# Patient Record
Sex: FEMALE | Race: WHITE | NOT HISPANIC OR LATINO
[De-identification: names, ages, dates, MRNs, and addresses within clinical notes are randomized per-mention and may not be internally consistent; named-entity substitution may affect disease eponyms.]

---

## 2017-06-11 ENCOUNTER — RESULT REVIEW (OUTPATIENT)
Age: 57
End: 2017-06-11

## 2017-06-12 ENCOUNTER — APPOINTMENT (OUTPATIENT)
Dept: OBGYN | Facility: CLINIC | Age: 57
End: 2017-06-12

## 2017-06-30 ENCOUNTER — APPOINTMENT (OUTPATIENT)
Dept: PULMONOLOGY | Facility: CLINIC | Age: 57
End: 2017-06-30

## 2017-10-30 ENCOUNTER — APPOINTMENT (OUTPATIENT)
Dept: PULMONOLOGY | Facility: CLINIC | Age: 57
End: 2017-10-30
Payer: COMMERCIAL

## 2017-10-30 VITALS
OXYGEN SATURATION: 96 % | DIASTOLIC BLOOD PRESSURE: 80 MMHG | HEART RATE: 81 BPM | HEIGHT: 62 IN | WEIGHT: 146 LBS | SYSTOLIC BLOOD PRESSURE: 110 MMHG | BODY MASS INDEX: 26.87 KG/M2

## 2017-10-30 DIAGNOSIS — Z86.39 PERSONAL HISTORY OF OTHER ENDOCRINE, NUTRITIONAL AND METABOLIC DISEASE: ICD-10-CM

## 2017-10-30 PROCEDURE — 99214 OFFICE O/P EST MOD 30 MIN: CPT | Mod: 25

## 2017-10-30 PROCEDURE — 94010 BREATHING CAPACITY TEST: CPT

## 2017-10-30 RX ORDER — PSEUDOEPHEDRINE HYDROCHLORIDE 120 MG/1
120 TABLET, FILM COATED, EXTENDED RELEASE ORAL
Refills: 0 | Status: ACTIVE | COMMUNITY

## 2018-04-30 ENCOUNTER — APPOINTMENT (OUTPATIENT)
Dept: PULMONOLOGY | Facility: CLINIC | Age: 58
End: 2018-04-30
Payer: COMMERCIAL

## 2018-04-30 VITALS
DIASTOLIC BLOOD PRESSURE: 80 MMHG | SYSTOLIC BLOOD PRESSURE: 122 MMHG | HEIGHT: 62 IN | BODY MASS INDEX: 26.68 KG/M2 | OXYGEN SATURATION: 98 % | HEART RATE: 92 BPM | WEIGHT: 145 LBS

## 2018-04-30 PROCEDURE — 94010 BREATHING CAPACITY TEST: CPT

## 2018-04-30 PROCEDURE — 99214 OFFICE O/P EST MOD 30 MIN: CPT | Mod: 25

## 2018-04-30 RX ORDER — SIMVASTATIN 10 MG/1
10 TABLET, FILM COATED ORAL
Qty: 90 | Refills: 0 | Status: ACTIVE | COMMUNITY
Start: 2018-04-19

## 2018-05-22 ENCOUNTER — APPOINTMENT (OUTPATIENT)
Dept: GASTROENTEROLOGY | Facility: CLINIC | Age: 58
End: 2018-05-22
Payer: COMMERCIAL

## 2018-05-22 VITALS
OXYGEN SATURATION: 98 % | HEART RATE: 80 BPM | BODY MASS INDEX: 27.97 KG/M2 | HEIGHT: 62 IN | SYSTOLIC BLOOD PRESSURE: 120 MMHG | TEMPERATURE: 98.5 F | WEIGHT: 152 LBS | DIASTOLIC BLOOD PRESSURE: 80 MMHG

## 2018-05-22 DIAGNOSIS — Z87.19 PERSONAL HISTORY OF OTHER DISEASES OF THE DIGESTIVE SYSTEM: ICD-10-CM

## 2018-05-22 DIAGNOSIS — Z83.79 FAMILY HISTORY OF OTHER DISEASES OF THE DIGESTIVE SYSTEM: ICD-10-CM

## 2018-05-22 PROCEDURE — 36415 COLL VENOUS BLD VENIPUNCTURE: CPT

## 2018-05-22 PROCEDURE — 99214 OFFICE O/P EST MOD 30 MIN: CPT | Mod: 25

## 2018-05-22 RX ORDER — GINSENG 100 MG
CAPSULE ORAL
Refills: 0 | Status: ACTIVE | COMMUNITY

## 2018-05-22 RX ORDER — ACETAMINOPHEN 325 MG/1
TABLET, FILM COATED ORAL
Refills: 0 | Status: ACTIVE | COMMUNITY

## 2018-05-22 RX ORDER — OXYCODONE AND ACETAMINOPHEN 10; 325 MG/1; MG/1
10-325 TABLET ORAL
Qty: 40 | Refills: 0 | Status: DISCONTINUED | COMMUNITY
Start: 2018-03-12 | End: 2018-05-22

## 2018-05-22 RX ORDER — PSEUDOEPHEDRINE HCL 120 MG
120 TABLET, EXTENDED RELEASE ORAL
Qty: 30 | Refills: 0 | Status: DISCONTINUED | COMMUNITY
Start: 2017-03-10 | End: 2018-05-22

## 2018-05-22 RX ORDER — MULTIVITAMIN
TABLET ORAL
Refills: 0 | Status: ACTIVE | COMMUNITY

## 2018-05-22 RX ORDER — CHOLECALCIFEROL (VITAMIN D3) 25 MCG
TABLET ORAL
Refills: 0 | Status: ACTIVE | COMMUNITY

## 2018-05-22 RX ORDER — TRIAMCINOLONE ACETONIDE 1 MG/G
0.1 CREAM TOPICAL
Qty: 30 | Refills: 0 | Status: DISCONTINUED | COMMUNITY
Start: 2018-03-26 | End: 2018-05-22

## 2018-05-24 LAB
ALBUMIN SERPL ELPH-MCNC: 4.1 G/DL
ALP BLD-CCNC: 91 U/L
ALT SERPL-CCNC: 17 U/L
ANION GAP SERPL CALC-SCNC: 11 MMOL/L
AST SERPL-CCNC: 19 U/L
BASOPHILS # BLD AUTO: 0.02 K/UL
BASOPHILS NFR BLD AUTO: 0.3 %
BILIRUB SERPL-MCNC: 1 MG/DL
BUN SERPL-MCNC: 28 MG/DL
CALCIUM SERPL-MCNC: 9.6 MG/DL
CHLORIDE SERPL-SCNC: 103 MMOL/L
CO2 SERPL-SCNC: 27 MMOL/L
CREAT SERPL-MCNC: 0.7 MG/DL
ENDOMYSIUM IGA SER QL: NEGATIVE
ENDOMYSIUM IGA TITR SER: NORMAL
EOSINOPHIL # BLD AUTO: 0.26 K/UL
EOSINOPHIL NFR BLD AUTO: 4 %
GLIADIN IGA SER QL: <5 UNITS
GLIADIN IGG SER QL: <5 UNITS
GLIADIN PEPTIDE IGA SER-ACNC: NEGATIVE
GLIADIN PEPTIDE IGG SER-ACNC: NEGATIVE
GLUCOSE SERPL-MCNC: 80 MG/DL
HCT VFR BLD CALC: 39.5 %
HGB BLD-MCNC: 12.5 G/DL
IGA SER QL IEP: 104 MG/DL
IMM GRANULOCYTES NFR BLD AUTO: 0.3 %
LYMPHOCYTES # BLD AUTO: 1.99 K/UL
LYMPHOCYTES NFR BLD AUTO: 30.9 %
MAN DIFF?: NORMAL
MCHC RBC-ENTMCNC: 29.1 PG
MCHC RBC-ENTMCNC: 31.6 GM/DL
MCV RBC AUTO: 91.9 FL
MONOCYTES # BLD AUTO: 0.49 K/UL
MONOCYTES NFR BLD AUTO: 7.6 %
NEUTROPHILS # BLD AUTO: 3.65 K/UL
NEUTROPHILS NFR BLD AUTO: 56.9 %
PLATELET # BLD AUTO: 255 K/UL
POTASSIUM SERPL-SCNC: 4.4 MMOL/L
PROT SERPL-MCNC: 7.7 G/DL
RBC # BLD: 4.3 M/UL
RBC # FLD: 13.7 %
SODIUM SERPL-SCNC: 141 MMOL/L
TSH SERPL-ACNC: 3.24 UIU/ML
TTG IGA SER IA-ACNC: <5 UNITS
TTG IGA SER-ACNC: NEGATIVE
TTG IGG SER IA-ACNC: <5 UNITS
TTG IGG SER IA-ACNC: NEGATIVE
WBC # FLD AUTO: 6.43 K/UL

## 2018-08-06 ENCOUNTER — RESULT REVIEW (OUTPATIENT)
Age: 58
End: 2018-08-06

## 2018-08-07 ENCOUNTER — APPOINTMENT (OUTPATIENT)
Dept: OBGYN | Facility: CLINIC | Age: 58
End: 2018-08-07
Payer: COMMERCIAL

## 2018-08-07 PROCEDURE — 99396 PREV VISIT EST AGE 40-64: CPT

## 2018-11-05 ENCOUNTER — APPOINTMENT (OUTPATIENT)
Dept: PULMONOLOGY | Facility: CLINIC | Age: 58
End: 2018-11-05

## 2018-11-14 ENCOUNTER — APPOINTMENT (OUTPATIENT)
Dept: PULMONOLOGY | Facility: CLINIC | Age: 58
End: 2018-11-14
Payer: COMMERCIAL

## 2018-11-14 ENCOUNTER — NON-APPOINTMENT (OUTPATIENT)
Age: 58
End: 2018-11-14

## 2018-11-14 VITALS
HEART RATE: 68 BPM | BODY MASS INDEX: 28.51 KG/M2 | DIASTOLIC BLOOD PRESSURE: 70 MMHG | SYSTOLIC BLOOD PRESSURE: 100 MMHG | HEIGHT: 61 IN | WEIGHT: 151 LBS | OXYGEN SATURATION: 98 % | RESPIRATION RATE: 17 BRPM

## 2018-11-14 PROCEDURE — 99214 OFFICE O/P EST MOD 30 MIN: CPT | Mod: 25

## 2018-11-14 PROCEDURE — 94010 BREATHING CAPACITY TEST: CPT

## 2018-11-14 PROCEDURE — 95012 NITRIC OXIDE EXP GAS DETER: CPT

## 2018-11-14 NOTE — HISTORY OF PRESENT ILLNESS
[FreeTextEntry1] : Ms. Altamirano is a 84 year old female presenting to the office for a follow up visit for allergic rhinitis, abnormal chest CT, asthma, and low vitamin D. Her chief complaint is her weight.\par -She notes she has been feeling well generally and she has been walking with her dog for exercise\par -She states she has been trying to sleep more and is feeling more well rested\par -She states she has been trying to work less as she is fatigued\par -She notes her GI issues have resolved\par -She states she takes Clarinex daily and Sudafed prn and it controls her allergies\par -she notes she sleeps well and does not believe she snores\par -she reports her bowels are regular\par -she states her sense of smell and taste are good\par -SHe states her breathing is well controlled\par -She reports she wants to lose weight\par -She denies heartburn, reflux, itchy eyes or ears, nasal congestion,  heartburn, reflux, coughing, wheezing,

## 2018-11-14 NOTE — ASSESSMENT
[FreeTextEntry1] : Ms. Altamirano is stable from a pulmonary perspective with a history of mild intermittent asthma, allergies, and abnormal chest CT. \par \par problem 1: abnormal chest CT  (found s/p uterine surgery)\par -f/u chest CT due in March 2019\par \par problem 2: allergic rhinitis\par -continue to use Clarinex 5 mg QAM\par -continue to use Xyzal 5 mg QHS\par -recommended Xlear \par -continue Zaditor eye drops prn \par \par -Environmental measures for allergies were encouraged including mattress and pillow cover, air purifier, and environmental controls.\par \par problem 3: mild intermittent asthma\par -quiet, no therapy at this point in time\par \par -Asthma is  believed to be caused by inherited (genetic) and environmental factor, but its exact cause is unknown. Asthma may be triggered by allergens, lung infections, or irritants in the air. Asthma triggers are different for each person\par \par problem 4: low vitamin D\par -continue to use supplemental vitamin D \par -Has been associated with asthma exacerbations and increased allergic symptoms. The goal based on recent information is maintaining levels between 50-70 and low normal is 30. Recommended 50,000 units every two weeks to once a month depending on the level.\par \par problem 5: GI complaints\par -recommended IB Guard\par \par problem 6: health maintenance \par -recommended CoQ 10 at 200 mg QD\par -recommended increased aerobic exercise \par -s/p 2018 flu shot \par -recommended strep pneumonia vaccines: Prevnar-13 vaccine, followed by Pneumo vaccine 23 one year following\par -recommended early intervention for URIs\par -recommended regular osteoporosis evaluations\par -recommended early dermatological evaluations\par -recommended after the age of 50 to the age of 70, colonoscopy every 5 years \par \par F/U in 6 months \par She is encouraged to call with any changes, concerns, or questions

## 2018-11-14 NOTE — ADDENDUM
[FreeTextEntry1] : Documented by Lori Iniguez acting as a scribe for Dr. Braulio Orourke on 11/14/2018.\par \par All medical record entries made by the Scribe were at my, Dr. Braulio Orourke's, direction and personally dictated by me on 11/14/2018. I have reviewed the chart and agree that the record accurately reflects my personal performance of the history, physical exam, assessment and plan. I have also personally directed, reviewed, and agree with the discharge instructions.

## 2018-11-14 NOTE — PROCEDURE
[FreeTextEntry1] : FENO was    13   ; a normal value being less than 25\par \par Fractional exhaled nitric oxide (FENO) is regarded as a simple, noninvasive method for assessing eosinophilic airway inflammation. Produced by a variety of cells within the lung, nitric oxide (NO) concentrations are generally low in healthy individuals. However, high concentrations of NO appear to be involved in nonspecific host defense mechanisms and chronic inflammatory diseases such as asthma. The American Thoracic Society (ATS) therefore has recommended using FENO to aid in the diagnosis and monitoring of eosinophilic airway inflammation and asthma, and for identifying steroid responsive individuals whose chronic respiratory symptoms may be caused by airway inflammation. \par \par PFT-spi reveals normal flows with FEV1 of  2.33 ,which is  98% of predicted, normal flow volume loop

## 2018-11-14 NOTE — REASON FOR VISIT
[Follow-Up] : a follow-up visit [FreeTextEntry1] : allergic rhinitis, abnormal chest CT, asthma, and low vitamin D

## 2019-05-14 ENCOUNTER — APPOINTMENT (OUTPATIENT)
Dept: PULMONOLOGY | Facility: CLINIC | Age: 59
End: 2019-05-14

## 2019-06-13 ENCOUNTER — APPOINTMENT (OUTPATIENT)
Dept: PULMONOLOGY | Facility: CLINIC | Age: 59
End: 2019-06-13
Payer: COMMERCIAL

## 2019-06-13 ENCOUNTER — NON-APPOINTMENT (OUTPATIENT)
Age: 59
End: 2019-06-13

## 2019-06-13 VITALS
SYSTOLIC BLOOD PRESSURE: 104 MMHG | WEIGHT: 147 LBS | RESPIRATION RATE: 17 BRPM | DIASTOLIC BLOOD PRESSURE: 78 MMHG | BODY MASS INDEX: 27.05 KG/M2 | OXYGEN SATURATION: 100 % | HEART RATE: 70 BPM | HEIGHT: 62 IN

## 2019-06-13 DIAGNOSIS — Z87.898 PERSONAL HISTORY OF OTHER SPECIFIED CONDITIONS: ICD-10-CM

## 2019-06-13 DIAGNOSIS — R15.2 FECAL URGENCY: ICD-10-CM

## 2019-06-13 PROCEDURE — 94010 BREATHING CAPACITY TEST: CPT

## 2019-06-13 PROCEDURE — 99214 OFFICE O/P EST MOD 30 MIN: CPT | Mod: 25

## 2019-06-13 RX ORDER — MINOCYCLINE HYDROCHLORIDE 100 MG/1
100 TABLET ORAL
Refills: 0 | Status: DISCONTINUED | COMMUNITY
End: 2019-06-13

## 2019-06-13 RX ORDER — LACTOBACILLUS RHAMNOSUS GG 10B CELL
CAPSULE ORAL
Refills: 0 | Status: DISCONTINUED | COMMUNITY
End: 2019-06-13

## 2019-06-13 NOTE — REVIEW OF SYSTEMS
[As Noted in HPI] : as noted in HPI [Itchy Eyes] : itching of ~T the eyes [Negative] : Sleep Disorder [Heartburn] : no heartburn [Diarrhea] : no diarrhea [Reflux] : no reflux [Constipation] : no constipation

## 2019-06-13 NOTE — PROCEDURE
[FreeTextEntry1] : PFT revealed normal flows, with a FEV1 of 2.18L, which is 88% of predicted, with a normal flow volume loop\par \par Chest CT (5.28.19) reveals small lower lobe pulmonary nodules remain unchanged over 5 years consistent with benign histology.  Previously noted right upper lobe and middle lobe bronchial wall thickening has resolved.  No acute intrathoracic abnormalities.

## 2019-06-13 NOTE — PHYSICAL EXAM
[General Appearance - Well Developed] : well developed [Normal Appearance] : normal appearance [No Deformities] : no deformities [Well Groomed] : well groomed [General Appearance - Well Nourished] : well nourished [General Appearance - In No Acute Distress] : no acute distress [Normal Conjunctiva] : the conjunctiva exhibited no abnormalities [Eyelids - No Xanthelasma] : the eyelids demonstrated no xanthelasmas [Normal Oropharynx] : normal oropharynx [II] : II [Neck Appearance] : the appearance of the neck was normal [Jugular Venous Distention Increased] : there was no jugular-venous distention [Neck Cervical Mass (___cm)] : no neck mass was observed [Thyroid Nodule] : there were no palpable thyroid nodules [Thyroid Diffuse Enlargement] : the thyroid was not enlarged [Heart Rate And Rhythm] : heart rate and rhythm were normal [Murmurs] : no murmurs present [Heart Sounds] : normal S1 and S2 [Respiration, Rhythm And Depth] : normal respiratory rhythm and effort [Exaggerated Use Of Accessory Muscles For Inspiration] : no accessory muscle use [Auscultation Breath Sounds / Voice Sounds] : lungs were clear to auscultation bilaterally [Abdomen Soft] : soft [Abdomen Tenderness] : non-tender [Abdomen Mass (___ Cm)] : no abdominal mass palpated [Abnormal Walk] : normal gait [Gait - Sufficient For Exercise Testing] : the gait was sufficient for exercise testing [Nail Clubbing] : no clubbing of the fingernails [Cyanosis, Localized] : no localized cyanosis [Petechial Hemorrhages (___cm)] : no petechial hemorrhages [Skin Color & Pigmentation] : normal skin color and pigmentation [] : no rash [No Venous Stasis] : no venous stasis [Skin Lesions] : no skin lesions [No Skin Ulcers] : no skin ulcer [No Xanthoma] : no  xanthoma was observed [Deep Tendon Reflexes (DTR)] : deep tendon reflexes were 2+ and symmetric [Sensation] : the sensory exam was normal to light touch and pinprick [No Focal Deficits] : no focal deficits [Oriented To Time, Place, And Person] : oriented to person, place, and time [Impaired Insight] : insight and judgment were intact [Affect] : the affect was normal [FreeTextEntry1] : I:E 1:3, clear

## 2019-06-13 NOTE — ASSESSMENT
[FreeTextEntry1] : Ms. Altamirano is stable from a pulmonary perspective with a history of mild intermittent asthma, allergies, and abnormal chest CT. \par \par problem 1: abnormal chest CT  (found s/p uterine surgery) (improved)\par -f/u chest CT due in 1/2020\par \par problem 2: allergic rhinitis\par -continue to use Clarinex 5 mg QAM\par -continue to use Xyzal 5 mg QHS\par -recommended Xlear \par -continue Zaditor eye drops prn \par \par -Environmental measures for allergies were encouraged including mattress and pillow cover, air purifier, and environmental controls.\par \par problem 3: mild intermittent asthma\par -quiet, no therapy at this point in time\par \par -Asthma is  believed to be caused by inherited (genetic) and environmental factor, but its exact cause is unknown. Asthma may be triggered by allergens, lung infections, or irritants in the air. Asthma triggers are different for each person\par \par problem 4: low vitamin D\par -continue to use supplemental vitamin D \par -Has been associated with asthma exacerbations and increased allergic symptoms. The goal based on recent information is maintaining levels between 50-70 and low normal is 30. Recommended 50,000 units every two weeks to once a month depending on the level.\par \par problem 5: GI complaints (quiet)\par -recommended IB Guard\par \par problem 6: health maintenance \par -Recommended to take Liva Guard to lower cholesterol\par -recommended CoQ 10 at 200 mg QD\par -recommended increased aerobic exercise \par -s/p 2018 flu shot \par -recommended strep pneumonia vaccines: Prevnar-13 vaccine, followed by Pneumo vaccine 23 one year following\par -recommended early intervention for URIs\par -recommended regular osteoporosis evaluations\par -recommended early dermatological evaluations\par -recommended after the age of 50 to the age of 70, colonoscopy every 5 years \par \par F/U in 6 months \par She is encouraged to call with any changes, concerns, or questions

## 2019-12-12 ENCOUNTER — NON-APPOINTMENT (OUTPATIENT)
Age: 59
End: 2019-12-12

## 2019-12-12 ENCOUNTER — APPOINTMENT (OUTPATIENT)
Dept: PULMONOLOGY | Facility: CLINIC | Age: 59
End: 2019-12-12
Payer: COMMERCIAL

## 2019-12-12 VITALS
HEIGHT: 60 IN | WEIGHT: 147 LBS | HEART RATE: 100 BPM | SYSTOLIC BLOOD PRESSURE: 112 MMHG | RESPIRATION RATE: 17 BRPM | OXYGEN SATURATION: 98 % | DIASTOLIC BLOOD PRESSURE: 70 MMHG | BODY MASS INDEX: 28.86 KG/M2

## 2019-12-12 PROCEDURE — 94010 BREATHING CAPACITY TEST: CPT

## 2019-12-12 PROCEDURE — 99214 OFFICE O/P EST MOD 30 MIN: CPT | Mod: 25

## 2019-12-12 PROCEDURE — 95012 NITRIC OXIDE EXP GAS DETER: CPT

## 2019-12-12 NOTE — HISTORY OF PRESENT ILLNESS
[FreeTextEntry1] : Ms. Altamirano is a 59 year old female presenting to the office for a follow up visit for allergic rhinitis, abnormal chest CT, asthma, and low vitamin D. Her chief complaint is working nights\par -she reports feeling generally well\par -she states she is sleeping well, and gets enough sleep, despite working night shifts at [employer]\par -she reports she is exercising more frequently by walking the dog between 2-6 miles per day\par -she reports her energy level is good\par -she is s/p the flu shot\par -she denies taking any new medications, vitamins, or supplements. \par -she denies any palpitations, chest pain, chest pressure, diarrhea, constipation, dysphagia, dizziness, sour taste in the mouth, leg swelling, leg pain, itchy eyes, itchy ears, heartburn, reflux, myalgias or arthralgias.

## 2019-12-12 NOTE — PROCEDURE
[FreeTextEntry1] : PFT revealed normal flows, with a FEV1 of 2.31L, which is 102% of predicted, with a normal flow volume loop \par \par FENO was 14; a normal value being less than 25\par Fractional exhaled nitric oxide (FENO) is regarded as a simple, noninvasive method for assessing eosinophilic airway inflammation. Produced by a variety of cells within the lung, nitric oxide (NO) concentrations are generally low in healthy individuals. However, high concentrations of NO appear to be involved in nonspecific host defense mechanisms and chronic inflammatory diseases such as asthma. The American Thoracic Society (ATS) therefore has recommended using FENO to aid in the diagnosis and monitoring of eosinophilic airway inflammation and asthma, and for identifying steroid responsive individuals whose chronic respiratory symptoms may be caused by airway inflammation.

## 2019-12-12 NOTE — ADDENDUM
[FreeTextEntry1] : Documented by Oscar Brown acting as a scribe for Dr. Braulio Orourke on 12/12/2019.\par \par All medical record entries made by the Scribe were at my, Dr. Braulio Orourke's, direction and personally dictated by me on 12/12/2019. I have reviewed the chart and agree that the record accurately reflects my personal performance of the history, physical exam, assessment and plan. I have also personally directed, reviewed, and agree with the discharge instructions.

## 2019-12-12 NOTE — ASSESSMENT
[FreeTextEntry1] : Ms. Altamirano is stable from a pulmonary perspective with a history of mild intermittent asthma, allergies, and abnormal chest CT - quiet.\par \par problem 1: abnormal chest CT  (found s/p uterine surgery) (improved)\par -f/u chest CT due in 5/2020\par \par problem 2: allergic rhinitis\par -continue to use Clarinex 5 mg QAM\par -continue to use Xyzal 5 mg QHS\par -recommended Xlear \par -continue Zaditor eye drops prn \par \par -Environmental measures for allergies were encouraged including mattress and pillow cover, air purifier, and environmental controls.\par \par problem 3: mild intermittent asthma\par -quiet, no therapy at this point in time\par \par -Asthma is  believed to be caused by inherited (genetic) and environmental factor, but its exact cause is unknown. Asthma may be triggered by allergens, lung infections, or irritants in the air. Asthma triggers are different for each person\par \par problem 4: low vitamin D\par -continue to use supplemental vitamin D \par -Has been associated with asthma exacerbations and increased allergic symptoms. The goal based on recent information is maintaining levels between 50-70 and low normal is 30. Recommended 50,000 units every two weeks to once a month depending on the level.\par \par problem 5: GI complaints (quiet)\par -recommended IB Guard\par \par problem 6: health maintenance \par -Recommended to take Liva Guard to lower cholesterol\par -recommended CoQ 10 at 200 mg QD\par -recommended increased aerobic exercise \par -s/p 2019 flu shot \par -recommended strep pneumonia vaccines: Prevnar-13 vaccine, followed by Pneumo vaccine 23 one year following\par -recommended early intervention for URIs\par -recommended regular osteoporosis evaluations\par -recommended early dermatological evaluations\par -recommended after the age of 50 to the age of 70, colonoscopy every 5 years \par \par F/U in 6 months \par She is encouraged to call with any changes, concerns, or questions

## 2019-12-12 NOTE — PHYSICAL EXAM
[General Appearance - Well Developed] : well developed [Normal Appearance] : normal appearance [Well Groomed] : well groomed [General Appearance - Well Nourished] : well nourished [No Deformities] : no deformities [General Appearance - In No Acute Distress] : no acute distress [Normal Conjunctiva] : the conjunctiva exhibited no abnormalities [Eyelids - No Xanthelasma] : the eyelids demonstrated no xanthelasmas [Normal Oropharynx] : normal oropharynx [II] : II [Neck Appearance] : the appearance of the neck was normal [Jugular Venous Distention Increased] : there was no jugular-venous distention [Thyroid Diffuse Enlargement] : the thyroid was not enlarged [Neck Cervical Mass (___cm)] : no neck mass was observed [Thyroid Nodule] : there were no palpable thyroid nodules [Murmurs] : no murmurs present [Heart Sounds] : normal S1 and S2 [Heart Rate And Rhythm] : heart rate and rhythm were normal [Exaggerated Use Of Accessory Muscles For Inspiration] : no accessory muscle use [Respiration, Rhythm And Depth] : normal respiratory rhythm and effort [Abdomen Soft] : soft [Auscultation Breath Sounds / Voice Sounds] : lungs were clear to auscultation bilaterally [Abdomen Tenderness] : non-tender [Abnormal Walk] : normal gait [Abdomen Mass (___ Cm)] : no abdominal mass palpated [Nail Clubbing] : no clubbing of the fingernails [Gait - Sufficient For Exercise Testing] : the gait was sufficient for exercise testing [Cyanosis, Localized] : no localized cyanosis [Petechial Hemorrhages (___cm)] : no petechial hemorrhages [Skin Color & Pigmentation] : normal skin color and pigmentation [] : no rash [No Venous Stasis] : no venous stasis [Skin Lesions] : no skin lesions [No Skin Ulcers] : no skin ulcer [No Xanthoma] : no  xanthoma was observed [Deep Tendon Reflexes (DTR)] : deep tendon reflexes were 2+ and symmetric [Sensation] : the sensory exam was normal to light touch and pinprick [No Focal Deficits] : no focal deficits [Impaired Insight] : insight and judgment were intact [Oriented To Time, Place, And Person] : oriented to person, place, and time [Affect] : the affect was normal [FreeTextEntry1] : I:E 1:3, clear

## 2020-09-17 ENCOUNTER — APPOINTMENT (OUTPATIENT)
Dept: OBGYN | Facility: CLINIC | Age: 60
End: 2020-09-17
Payer: COMMERCIAL

## 2020-09-17 ENCOUNTER — RESULT REVIEW (OUTPATIENT)
Age: 60
End: 2020-09-17

## 2020-09-17 PROCEDURE — 99396 PREV VISIT EST AGE 40-64: CPT

## 2020-09-22 ENCOUNTER — APPOINTMENT (OUTPATIENT)
Dept: PULMONOLOGY | Facility: CLINIC | Age: 60
End: 2020-09-22
Payer: COMMERCIAL

## 2020-09-22 VITALS
DIASTOLIC BLOOD PRESSURE: 70 MMHG | OXYGEN SATURATION: 95 % | TEMPERATURE: 97 F | SYSTOLIC BLOOD PRESSURE: 116 MMHG | HEART RATE: 72 BPM | WEIGHT: 152.38 LBS | BODY MASS INDEX: 29.92 KG/M2 | HEIGHT: 60 IN | RESPIRATION RATE: 17 BRPM

## 2020-09-22 PROCEDURE — 99214 OFFICE O/P EST MOD 30 MIN: CPT

## 2020-09-22 RX ORDER — CITALOPRAM 40 MG/1
40 TABLET, FILM COATED ORAL
Refills: 0 | Status: ACTIVE | COMMUNITY

## 2020-09-22 NOTE — ADDENDUM
[FreeTextEntry1] : Documented by Cristina Trujillo acting as a scribe for Dr. Braulio Orourke on 09/22/2020 \par \par All medical record entries made by the Scribe were at my, Dr. Braulio Orourke's, direction and personally dictated by me on 09/22/2020 . I have reviewed the chart and agree that the record accurately reflects my personal performance of the history, physical exam, assessment and plan. I have also personally directed, reviewed, and agree with the discharge instructions.

## 2020-09-22 NOTE — HISTORY OF PRESENT ILLNESS
[FreeTextEntry1] : Ms. Altamirano is a 60 year old female presenting to the office for a follow up visit for allergic rhinitis, abnormal chest CT, asthma, and low vitamin D. Her chief complaint is\par - she has been feeling well in general, she was doing good until she got the flu shot, she has been feeling a bit draggy. \par - she has been walking with her dog \par - she recently got a Pel[employer] and has used it. \par - she notes she has not been eating much but yet her weight has been fluctuating a bit \par - she notes she had horrible reflux two nights in a row but now its stable\par - her sleep outside of work has been fine \par - she has an allergy cough \par - no muscle / joint aches or pains\par - has not been snoring much\par - not up at night to urinate\par - she has been taking Celexa again. \par - She  denies any visual issues, headaches, nausea, vomiting, fever, chills, sweats, chest pains, chest pressure, diarrhea, constipation, dysphagia, myalgia, dizziness, leg swelling, leg pain, itchy eyes, itchy ears, heartburn, reflux, or sour taste in the mouth.

## 2020-09-22 NOTE — ASSESSMENT
[FreeTextEntry1] : Ms. Altamirano is 60 y.o F who is stable from a pulmonary perspective with a history of mild intermittent asthma, allergies, and abnormal chest CT - quiet.\par \par problem 1: abnormal chest CT  (found s/p uterine surgery) (improved)\par -f/u chest CT due in 6/2021\par CAT scans are the only radiological modality to identify abnormalities w/in the lungs with regards to nodules/masses/lymph nodes. Risks, benefits were reviewed in detail. The guidelines for abnormalities include follow up CT scans at various intervals which could range from 6 weeks to 1 year intervals. If there is a change for the worse then consideration for a biopsy will be considered if you are a candidate. Second opinion evaluation with a thoracic surgeon or an interventional radiologist could be offered. \par \par problem 2: allergic rhinitis\par -continue to use Clarinex 5 mg QAM\par -continue to use Xyzal 5 mg QHS\par -recommended Xlear \par -continue Zaditor eye drops prn \par \par -Environmental measures for allergies were encouraged including mattress and pillow cover, air purifier, and environmental controls.\par \par problem 3: mild intermittent asthma\par -quiet, no therapy at this point in time\par \par -Asthma is  believed to be caused by inherited (genetic) and environmental factor, but its exact cause is unknown. Asthma may be triggered by allergens, lung infections, or irritants in the air. Asthma triggers are different for each person\par \par problem 4: low vitamin D\par -continue to use supplemental vitamin D \par -Has been associated with asthma exacerbations and increased allergic symptoms. The goal based on recent information is maintaining levels between 50-70 and low normal is 30. Recommended 50,000 units every two weeks to once a month depending on the level.\par \par problem 5: GI complaints (quiet)\par -recommended IB Guard\par \par problem 6: health maintenance \par -Recommended to take Liva Guard to lower cholesterol\par -recommended CoQ 10 at 200 mg QD\par -recommended increased aerobic exercise \par -s/p flu shot - 9/22/2020\par -recommended strep pneumonia vaccines: Prevnar-13 vaccine, followed by Pneumo vaccine 23 one year following\par -recommended early intervention for URIs\par -recommended regular osteoporosis evaluations\par -recommended early dermatological evaluations\par -recommended after the age of 50 to the age of 70, colonoscopy every 5 years \par \par F/U in 6 months \par She is encouraged to call with any changes, concerns, or questions

## 2020-12-16 ENCOUNTER — NON-APPOINTMENT (OUTPATIENT)
Age: 60
End: 2020-12-16

## 2020-12-17 ENCOUNTER — APPOINTMENT (OUTPATIENT)
Dept: GASTROENTEROLOGY | Facility: CLINIC | Age: 60
End: 2020-12-17
Payer: COMMERCIAL

## 2020-12-17 DIAGNOSIS — R10.9 UNSPECIFIED ABDOMINAL PAIN: ICD-10-CM

## 2020-12-17 DIAGNOSIS — Z80.0 ENCOUNTER FOR SCREENING FOR MALIGNANT NEOPLASM OF COLON: ICD-10-CM

## 2020-12-17 DIAGNOSIS — Z12.11 ENCOUNTER FOR SCREENING FOR MALIGNANT NEOPLASM OF COLON: ICD-10-CM

## 2020-12-17 PROCEDURE — 99214 OFFICE O/P EST MOD 30 MIN: CPT | Mod: 95

## 2020-12-17 RX ORDER — SODIUM PICOSULFATE, MAGNESIUM OXIDE, AND ANHYDROUS CITRIC ACID 10; 3.5; 12 MG/160ML; G/160ML; G/160ML
10-3.5-12 MG-GM LIQUID ORAL
Qty: 1 | Refills: 0 | Status: ACTIVE | COMMUNITY
Start: 2020-12-17 | End: 1900-01-01

## 2020-12-17 NOTE — ASSESSMENT
[FreeTextEntry1] : Patient with episodes of diarrhea, abdominal pain, and abdominal cramping with normal bowel movements in between.  The episodes have been increasing in frequency.  Additionally, the patient has a father with metastatic colon cancer.\par \par A colonoscopy has been scheduled. The risks, benefits, alternatives, and limitations of the procedure, including the possibility of missed lesions, were explained.\par \par Patient will keep a dietary log to see if we could not find any associations of food intake and her symptoms.\par \par Patient was given hyoscyamine 0.125 mg sublingual pills to use as needed.\par \par \par Plan from 5/22/2018 - Patient with loose stools that began in one month after surgery. She has associated urgency. This appeared to improve until today when she again had loose stools. She has been on an antibiotic for the past 5 days. We should rule out C. difficile or other infection. It is possible that today's loose stools are related to the minocycline.\par \par Stool will be sent for C. difficile PCR and GI infectious PCR.\par \par Bloodwork was sent for CBC, chem-pack, TSH, celiac markers. \par \par Patient will stop minocycline and we will observe her bowel movements over the next 2 weeks. The patient will call me to update me on her status.

## 2020-12-17 NOTE — CONSULT LETTER
[FreeTextEntry1] : Dear Dr. Amanda Kimbrough,\[facility] \[facility] I had the pleasure of seeing your patient CARMEN POMPA in the office today.  My office note is attached. PLEASE READ THE "ASSESSMENT" SECTION OF THE NOTE TO SEE MY IMPRESSION AND PLAN.\par \[facility] Thank you very much for allowing me to participate in the care of your patient.\[facility] \[facility] Sincerely,\[facility] \[facility] Levi Rice M.D., FAC, [facility] Director, Celiac Program at [facility]  of Medicine\[facility]reya [employer] at [facility] Practice Director,\[facility] - Gastroenterology/Internal Medicine at [place]\[facility] [address]\[place], [place] [address] Tel: [phone]\[facility] Email: [email or web address] \[facility] \[facility] The attached note has been created using a voice recognition system (Dragon).  There may be some misspellings and typos.  Please call my office if you have any issues or questions.

## 2020-12-17 NOTE — HISTORY OF PRESENT ILLNESS
[Home] : at home, [unfilled] , at the time of the visit. [Other Location: e.g. Home (Enter Location, City,State)___] : at [unfilled] [Verbal consent obtained from patient] : the patient, [unfilled] [FreeTextEntry1] : The patient reports intermittent GI symptoms.  She gets episodes of diarrhea lasting 1 to 2 days at a time with significant abdominal pain and cramping.  She eats a light diet and the symptoms improved.  These episodes have been increasing in frequency in the last few months.  She does not note any clear triggers of foods and/or stress.  Otherwise, the patient has 1 solid bowel movement a day.  She denies melena or bright red blood per rectum.  She gets rare heartburn but denies nausea or vomiting.  She denies dysphagia.  The patient has had mild weight gain.  The patient's father had metastatic colon cancer.  She had COVID-19 in April but was not hospitalized and recovered.  The patient has not been admitted to the hospital in the past year and denies any cardiac issues.\par \par \par Note from 5/22/2018 - The patient underwent rotator cuff surgery on March 13. One month later, she developed diarrhea with urgency. She would go 4-5 times a day and had one or 2 accidents. She did see mucus in her stool but denies melena or bright red blood per rectum. The diarrhea improved over the past 2 weeks. She now had loose stools this morning. She has been on minocycline for the past 5 days. She does note mild cramping relieved by a bowel movement. She denies nausea, vomiting, heartburn, dysphagia. She has gained 7 pounds over the past 2 months. The patient has a father who had metastatic colon cancer. She has a history of uterine cancer treated only with surgery in 2011. She also has a history of diverticulitis in 2007. The patient's last colonoscopy was in June 2016. The patient has not been hospitalized in the past year and denies any cardiac issues.

## 2021-01-05 ENCOUNTER — TRANSCRIPTION ENCOUNTER (OUTPATIENT)
Age: 61
End: 2021-01-05

## 2021-01-05 RX ORDER — HYOSCYAMINE SULFATE 0.12 MG/1
0.12 TABLET SUBLINGUAL 4 TIMES DAILY
Qty: 90 | Refills: 1 | Status: ACTIVE | COMMUNITY
Start: 2020-12-17 | End: 1900-01-01

## 2021-01-21 ENCOUNTER — APPOINTMENT (OUTPATIENT)
Dept: GASTROENTEROLOGY | Facility: AMBULATORY MEDICAL SERVICES | Age: 61
End: 2021-01-21
Payer: COMMERCIAL

## 2021-01-21 PROCEDURE — 45385 COLONOSCOPY W/LESION REMOVAL: CPT | Mod: 33

## 2021-01-21 PROCEDURE — 45380 COLONOSCOPY AND BIOPSY: CPT | Mod: 59

## 2021-02-12 ENCOUNTER — APPOINTMENT (OUTPATIENT)
Dept: GASTROENTEROLOGY | Facility: CLINIC | Age: 61
End: 2021-02-12
Payer: COMMERCIAL

## 2021-02-12 DIAGNOSIS — D12.6 BENIGN NEOPLASM OF COLON, UNSPECIFIED: ICD-10-CM

## 2021-02-12 DIAGNOSIS — K64.4 RESIDUAL HEMORRHOIDAL SKIN TAGS: ICD-10-CM

## 2021-02-12 DIAGNOSIS — R19.5 OTHER FECAL ABNORMALITIES: ICD-10-CM

## 2021-02-12 DIAGNOSIS — R10.9 UNSPECIFIED ABDOMINAL PAIN: ICD-10-CM

## 2021-02-12 DIAGNOSIS — K64.8 OTHER HEMORRHOIDS: ICD-10-CM

## 2021-02-12 PROCEDURE — 99213 OFFICE O/P EST LOW 20 MIN: CPT | Mod: 95

## 2021-02-14 NOTE — HISTORY OF PRESENT ILLNESS
[Home] : at home, [unfilled] , at the time of the visit. [Medical Office: ([facility])___] : at the medical office located in  [Verbal consent obtained from patient] : the patient, [unfilled] [FreeTextEntry1] : We reviewed the patient's colonoscopy performed on January 21, 2021.  The examination was significant for removal of an adenomatous colonic polyp from the mid descending colon as well as internal and external hemorrhoids which are asymptomatic.  Random right and left colonic biopsies were negative.  The patient is feeling better and has not needed to take hyoscyamine since she last saw me.  She has had no episodes of cramping or loose stools over the past 2 months.  She has a family history of metastatic colon cancer in her father.\par \par \par Note from 12/17/2020 - The patient reports intermittent GI symptoms.  She gets episodes of diarrhea lasting 1 to 2 days at a time with significant abdominal pain and cramping.  She eats a light diet and the symptoms improved.  These episodes have been increasing in frequency in the last few months.  She does not note any clear triggers of foods and/or stress.  Otherwise, the patient has 1 solid bowel movement a day.  She denies melena or bright red blood per rectum.  She gets rare heartburn but denies nausea or vomiting.  She denies dysphagia.  The patient has had mild weight gain.  The patient's father had metastatic colon cancer.  She had COVID-19 in April but was not hospitalized and recovered.  The patient has not been admitted to the hospital in the past year and denies any cardiac issues.\par \par \par Note from 5/22/2018 - The patient underwent rotator cuff surgery on March 13. One month later, she developed diarrhea with urgency. She would go 4-5 times a day and had one or 2 accidents. She did see mucus in her stool but denies melena or bright red blood per rectum. The diarrhea improved over the past 2 weeks. She now had loose stools this morning. She has been on minocycline for the past 5 days. She does note mild cramping relieved by a bowel movement. She denies nausea, vomiting, heartburn, dysphagia. She has gained 7 pounds over the past 2 months. The patient has a father who had metastatic colon cancer. She has a history of uterine cancer treated only with surgery in 2011. She also has a history of diverticulitis in 2007. The patient's last colonoscopy was in June 2016. The patient has not been hospitalized in the past year and denies any cardiac issues.

## 2021-02-14 NOTE — CONSULT LETTER
[FreeTextEntry1] : Dear Dr. Amanda Kimbrough,\[facility] \[facility] I had the pleasure of seeing your patient CARMEN POMPA in the office today.  My office note is attached. PLEASE READ THE "ASSESSMENT" SECTION OF THE NOTE TO SEE MY IMPRESSION AND PLAN.\par \[facility] Thank you very much for allowing me to participate in the care of your patient.\[facility] \[facility] Sincerely,\[facility] \[facility] Levi Rice M.D., FAC, [facility] Director, Celiac Program at [facility]  of Medicine\[facility] [employer] at [facility] Practice Director,\[facility] - Gastroenterology/Internal Medicine at [place]\[facility] [address]\[place], [place] [address] Tel: [phone]\[facility] Email: [email or web address] \[facility] \[facility] The attached note has been created using a voice recognition system (Dragon).  There may be some misspellings and typos.  Please call my office if you have any issues or questions.

## 2021-02-14 NOTE — ASSESSMENT
[FreeTextEntry1] : Patient with an adenomatous polyp and a first-degree relative with metastatic colon cancer.  Her symptoms of cramping and loose stools are significantly improved.\par \par Patient was advised that she can still use hyoscyamine if she has any cramping.\par \par Patient will call me if significant symptoms develop.\par \par Otherwise, we will repeat a colonoscopy in 3 years that is January 2024.\par \par \par Plan from 12/17/2020 - Patient with episodes of diarrhea, abdominal pain, and abdominal cramping with normal bowel movements in between.  The episodes have been increasing in frequency.  Additionally, the patient has a father with metastatic colon cancer.\par \par A colonoscopy has been scheduled. The risks, benefits, alternatives, and limitations of the procedure, including the possibility of missed lesions, were explained.\par \par Patient will keep a dietary log to see if we could not find any associations of food intake and her symptoms.\par \par Patient was given hyoscyamine 0.125 mg sublingual pills to use as needed.\par \par \par Plan from 5/22/2018 - Patient with loose stools that began in one month after surgery. She has associated urgency. This appeared to improve until today when she again had loose stools. She has been on an antibiotic for the past 5 days. We should rule out C. difficile or other infection. It is possible that today's loose stools are related to the minocycline.\par \par Stool will be sent for C. difficile PCR and GI infectious PCR.\par \par Bloodwork was sent for CBC, chem-pack, TSH, celiac markers. \par \par Patient will stop minocycline and we will observe her bowel movements over the next 2 weeks. The patient will call me to update me on her status.

## 2021-02-16 VITALS — HEIGHT: 62 IN

## 2021-03-23 ENCOUNTER — APPOINTMENT (OUTPATIENT)
Dept: PULMONOLOGY | Facility: CLINIC | Age: 61
End: 2021-03-23
Payer: COMMERCIAL

## 2021-03-23 VITALS
OXYGEN SATURATION: 97 % | HEIGHT: 62 IN | WEIGHT: 150 LBS | RESPIRATION RATE: 16 BRPM | DIASTOLIC BLOOD PRESSURE: 70 MMHG | TEMPERATURE: 97.6 F | SYSTOLIC BLOOD PRESSURE: 110 MMHG | HEART RATE: 81 BPM | BODY MASS INDEX: 27.6 KG/M2

## 2021-03-23 PROCEDURE — 99214 OFFICE O/P EST MOD 30 MIN: CPT

## 2021-03-23 PROCEDURE — 99072 ADDL SUPL MATRL&STAF TM PHE: CPT

## 2021-03-23 NOTE — ADDENDUM
[FreeTextEntry1] : Documented by Maciel Sorto acting as a scribe for Dr. Braulio Orourke on 03/23/2021.\par \par All medical record entries made by the Scribe were at my, Dr. Braulio Orourke's, direction and personally dictated by me on 03/23/2021 . I have reviewed the chart and agree that the record accurately reflects my personal performance of the history, physical exam, assessment and plan. I have also personally directed, reviewed, and agree with the discharge instructions. \par

## 2021-03-23 NOTE — ASSESSMENT
[FreeTextEntry1] : Ms. Altamirano is 60 y.o F who is stable from a pulmonary perspective with a history of mild intermittent asthma, allergies, and abnormal chest CT - quiet.s/p COVID 19 vaccine. \par \par problem 1: abnormal chest CT  (found s/p uterine surgery) (improved)\par -f/u chest CT due in 6/2021\par CAT scans are the only radiological modality to identify abnormalities w/in the lungs with regards to nodules/masses/lymph nodes. Risks, benefits were reviewed in detail. The guidelines for abnormalities include follow up CT scans at various intervals which could range from 6 weeks to 1 year intervals. If there is a change for the worse then consideration for a biopsy will be considered if you are a candidate. Second opinion evaluation with a thoracic surgeon or an interventional radiologist could be offered. \par \par problem 2: allergic rhinitis\par -continue to use Clarinex 5 mg QAM\par -continue to use Xyzal 5 mg QHS\par -recommended Xlear \par -continue Zaditor eye drops prn \par -Environmental measures for allergies were encouraged including mattress and pillow cover, air purifier, and environmental controls.\par \par problem 3: mild intermittent asthma\par -quiet, no therapy at this point in time\par -Asthma is  believed to be caused by inherited (genetic) and environmental factor, but its exact cause is unknown. Asthma may be triggered by allergens, lung infections, or irritants in the air. Asthma triggers are different for each person\par \par problem 4: low vitamin D\par -continue to use supplemental vitamin D \par -Has been associated with asthma exacerbations and increased allergic symptoms. The goal based on recent information is maintaining levels between 50-70 and low normal is 30. Recommended 50,000 units every two weeks to once a month depending on the level.\par \par problem 5: GI complaints (quiet)\par -recommended IB Guard\par \par problem 6: health maintenance \par -s/p COVID 19 vaccine Pfizer x 2\par -Recommended to take Liva Guard to lower cholesterol\par -recommended CoQ 10 at 200 mg QD\par -recommended increased aerobic exercise \par -s/p flu shot - 9/22/2020\par -recommended strep pneumonia vaccines: Prevnar-13 vaccine, followed by Pneumo vaccine 23 one year following\par -recommended early intervention for URIs\par -recommended regular osteoporosis evaluations\par -recommended early dermatological evaluations\par -recommended after the age of 50 to the age of 70, colonoscopy every 5 years \par \par F/U in 6 months \par She is encouraged to call with any changes, concerns, or questions

## 2021-03-23 NOTE — HISTORY OF PRESENT ILLNESS
[FreeTextEntry1] : Ms. Altamirano is a 60 year old female presenting to the office for a follow up visit for allergic rhinitis, abnormal chest CT, asthma, and low vitamin D. Her chief complaint is\par \par -she notes generally feeling well\par -she notes energy levels stable\par -she notes regular bowel movements \par -she notes IBS controlled\par -she notes polyp found on colonoscopy\par -she notes exercise limited after dog's death\par -she notes weight increased by COVID 19 inactivitiy\par -she notes sinus pressure and congestion onset 2-3 days ago now resolved\par -she denies cough\par -she denies wheeze\par -she notes s/p COVID 19 Vaccine Pfizer x 2\par \par -denies any chest pain, chest pressure, diarrhea, constipation, dysphagia, sour taste in the mouth, dizziness, leg swelling, leg pain, myalgias, arthralgias, itchy eyes, itchy ears, heartburn, or reflux.\par \par

## 2021-09-23 ENCOUNTER — APPOINTMENT (OUTPATIENT)
Dept: PULMONOLOGY | Facility: CLINIC | Age: 61
End: 2021-09-23
Payer: COMMERCIAL

## 2021-09-23 VITALS
RESPIRATION RATE: 16 BRPM | HEART RATE: 78 BPM | WEIGHT: 150 LBS | BODY MASS INDEX: 27.6 KG/M2 | SYSTOLIC BLOOD PRESSURE: 102 MMHG | HEIGHT: 62 IN | TEMPERATURE: 97.1 F | DIASTOLIC BLOOD PRESSURE: 68 MMHG | OXYGEN SATURATION: 97 %

## 2021-09-23 PROCEDURE — 99214 OFFICE O/P EST MOD 30 MIN: CPT

## 2021-09-23 NOTE — HISTORY OF PRESENT ILLNESS
[FreeTextEntry1] : Ms. Altamirano is a 61 year old female presenting to the office for a follow up visit for allergic rhinitis, abnormal chest CT, asthma, and low vitamin D. Her chief complaint is\par \par -she notes stress with work \par -she energy level is improving but not at baseline\par -she notes gaining weight at the start of summer and has since lost 10 lbs since June\par -she notes exercise fluctuates due to knee pain \par -she notes stomach issues and IBS\par -she notes getting enough sleep \par -she notes concern over her 3 children \par -she notes stopping all of her medications and looking to restart\par -she notes Covid 19 vaccine was in January and booster pending \par \par - She  denies any visual issues, headaches, nausea, vomiting, fever, chills, sweats, chest pains, chest pressure, dysphagia, myalgia, dizziness, leg swelling, itchy eyes, itchy ears, heartburn, reflux, or sour taste in the mouth.

## 2021-09-23 NOTE — ADDENDUM
[FreeTextEntry1] : Documented by Luis E Loyd acting as a scribe for Dr. Braulio Orourke on 09/23/2021 \par \par All medical record entries made by the Scribe were at my, Dr. Braulio Orourke's, direction and personally dictated by me on 09/23/2021 . I have reviewed the chart and agree that the record accurately reflects my personal performance of the history, physical exam, assessment and plan. I have also personally directed, reviewed, and agree with the discharge instructions

## 2021-09-23 NOTE — ASSESSMENT
[FreeTextEntry1] : Ms. Altamirano is 61 y.o F who is stable from a pulmonary perspective with a history of mild intermittent asthma, allergies, and abnormal chest CT - quiet.s/p COVID 19 vaccine. -pending booster shot \par \par problem 1: abnormal chest CT  (found s/p uterine surgery) (improved)\par -f/u chest CT due in 6/2021 (OVERDUE)\par CAT scans are the only radiological modality to identify abnormalities w/in the lungs with regards to nodules/masses/lymph nodes. Risks, benefits were reviewed in detail. The guidelines for abnormalities include follow up CT scans at various intervals which could range from 6 weeks to 1 year intervals. If there is a change for the worse then consideration for a biopsy will be considered if you are a candidate. Second opinion evaluation with a thoracic surgeon or an interventional radiologist could be offered. \par \par problem 2: allergic rhinitis\par -continue to use Clarinex 5 mg QAM\par -continue to use Xyzal 5 mg QHS\par -recommended Xlear \par -continue Zaditor eye drops prn \par -Environmental measures for allergies were encouraged including mattress and pillow cover, air purifier, and environmental controls.\par \par problem 3: mild intermittent asthma\par -quiet, no therapy at this point in time\par -Asthma is  believed to be caused by inherited (genetic) and environmental factor, but its exact cause is unknown. Asthma may be triggered by allergens, lung infections, or irritants in the air. Asthma triggers are different for each person\par \par problem 4: low vitamin D\par -continue to use supplemental vitamin D \par -Has been associated with asthma exacerbations and increased allergic symptoms. The goal based on recent information is maintaining levels between 50-70 and low normal is 30. Recommended 50,000 units every two weeks to once a month depending on the level.\par \par problem 5: GI complaints (quiet)\par -recommended IB Guard\par \par problem 6: health maintenance \par -booster pending \par -Covid 19 preclusions, vaccine and booster discussed at length and recommended \par -s/p COVID 19 vaccine Pfizer x 2\par -Recommended to take Liva Guard to lower cholesterol\par -recommended CoQ 10 at 200 mg QD\par -recommended increased aerobic exercise \par -s/p flu shot - 9/22/2020\par -recommended strep pneumonia vaccines: Prevnar-13 vaccine, followed by Pneumo vaccine 23 one year following\par -recommended early intervention for URIs\par -recommended regular osteoporosis evaluations\par -recommended early dermatological evaluations\par -recommended after the age of 50 to the age of 70, colonoscopy every 5 years \par \par F/U in 6 months \par She is encouraged to call with any changes, concerns, or questions

## 2022-03-24 ENCOUNTER — NON-APPOINTMENT (OUTPATIENT)
Age: 62
End: 2022-03-24

## 2022-03-24 ENCOUNTER — APPOINTMENT (OUTPATIENT)
Dept: PULMONOLOGY | Facility: CLINIC | Age: 62
End: 2022-03-24
Payer: COMMERCIAL

## 2022-03-24 VITALS
HEART RATE: 84 BPM | OXYGEN SATURATION: 98 % | HEIGHT: 62 IN | BODY MASS INDEX: 27.6 KG/M2 | SYSTOLIC BLOOD PRESSURE: 110 MMHG | RESPIRATION RATE: 16 BRPM | DIASTOLIC BLOOD PRESSURE: 70 MMHG | WEIGHT: 150 LBS | TEMPERATURE: 97.8 F

## 2022-03-24 PROCEDURE — 99214 OFFICE O/P EST MOD 30 MIN: CPT | Mod: 25

## 2022-03-24 PROCEDURE — 94010 BREATHING CAPACITY TEST: CPT

## 2022-03-24 PROCEDURE — 95012 NITRIC OXIDE EXP GAS DETER: CPT

## 2022-03-24 RX ORDER — ZOSTER VACCINE RECOMBINANT, ADJUVANTED 50 MCG/0.5
50 KIT INTRAMUSCULAR
Qty: 1 | Refills: 0 | Status: DISCONTINUED | OUTPATIENT
Start: 2022-03-24 | End: 2022-03-24

## 2022-03-24 RX ORDER — ZOSTER VACCINE RECOMBINANT, ADJUVANTED 50 MCG/0.5
50 KIT INTRAMUSCULAR
Qty: 1 | Refills: 0 | Status: ACTIVE | COMMUNITY
Start: 2022-03-24 | End: 1900-01-01

## 2022-03-24 NOTE — HISTORY OF PRESENT ILLNESS
[FreeTextEntry1] : Ms. Altamirano is a 61 year old female presenting to the office for a follow up visit for allergic rhinitis, abnormal chest CT, asthma, and low vitamin D. Her chief complaint is\par \par -she notes she is contemplating move to [facility] \par -she notes energy level is stable and good \par -she notes walking for exercise \par -she notes GI issues that limits exercise\par -she notes use of probiotics for Gi issues \par -she notes severe abdominal cramps 2 weeks ago that has resolved \par -she denies SOB\par -she denies SOB on her back and during the middle of the nigh t\par -she notes allergies is controlled with Clarinex \par -s/p Covid 19 vaccine x3 \par \par -denies any visual issues, headaches, nausea, vomiting, fever, chills, sweats, chest pain, chest pressure, diarrhea, constipation, dysphagia, dizziness, leg swelling, leg pain, itchy eyes, itchy ears, heartburn, reflux, or sour taste in the mouth.

## 2022-03-24 NOTE — ASSESSMENT
[FreeTextEntry1] : Ms. Altamirano is 61 y.o F who is stable from a pulmonary perspective with a history of mild intermittent asthma, allergies, and abnormal chest CT - quiet.s/p COVID 19 vaccine x 3\par \par problem 1: abnormal chest CT  (found s/p uterine surgery) (improved)\par -f/u chest CT due in 6/2021 (OVERDUE)- rescript 3/2022\par CAT scans are the only radiological modality to identify abnormalities w/in the lungs with regards to nodules/masses/lymph nodes. Risks, benefits were reviewed in detail. The guidelines for abnormalities include follow up CT scans at various intervals which could range from 6 weeks to 1 year intervals. If there is a change for the worse then consideration for a biopsy will be considered if you are a candidate. Second opinion evaluation with a thoracic surgeon or an interventional radiologist could be offered. \par \par problem 2: allergic rhinitis\par -continue to use Clarinex 5 mg QAM\par -continue to use Xyzal 5 mg QHS\par -recommended Xlear \par -continue Zaditor eye drops prn \par -Environmental measures for allergies were encouraged including mattress and pillow cover, air purifier, and environmental controls.\par \par problem 3: mild intermittent asthma- quiet \par -quiet, no therapy at this point in time\par -Asthma is  believed to be caused by inherited (genetic) and environmental factor, but its exact cause is unknown. Asthma may be triggered by allergens, lung infections, or irritants in the air. Asthma triggers are different for each person\par \par problem 4: low vitamin D\par -continue to use supplemental vitamin D \par -Has been associated with asthma exacerbations and increased allergic symptoms. The goal based on recent information is maintaining levels between 50-70 and low normal is 30. Recommended 50,000 units every two weeks to once a month depending on the level.\par \par problem 5: GI complaints (quiet)\par -recommended IB Guard\par \par problem 6: health maintenance \par -Covid 19 preclusions, vaccine and booster discussed at length and recommended \par -s/p COVID 19 vaccine Pfizer x 3\par -Recommended to take Liva Guard to lower cholesterol\par -recommended CoQ 10 at 200 mg QD\par -recommended increased aerobic exercise \par -s/p flu shot - 2021\par -recommended strep pneumonia vaccines: Prevnar-13 vaccine, followed by Pneumo vaccine 23 one year following\par -recommended early intervention for URIs\par -recommended regular osteoporosis evaluations\par -recommended early dermatological evaluations\par -recommended after the age of 50 to the age of 70, colonoscopy every 5 years \par \par F/U in 6 months \par She is encouraged to call with any changes, concerns, or questions

## 2022-03-24 NOTE — PROCEDURE
[FreeTextEntry1] : PFT revealed normal flows, with a FEV1 of 2.12L,which is 89% of predicted with a normal flow volume loop \par \par FENO was 14 ; a normal value being less than 25\par Fractional exhaled nitric oxide (FENO) is regarded as a simple, noninvasive method for assessing eosinophilic airway inflammation. Produced by a variety of cells within the lung, nitric oxide (NO) concentrations are generally low in healthy individuals. However, high concentrations of NO appear to be involved in nonspecific host defense mechanisms and chronic inflammatory diseases such as asthma. The American Thoracic Society (ATS) therefore has recommended using FENO to aid in the diagnosis and monitoring of eosinophilic airway inflammation and asthma, and for identifying steroid responsive individuals whose chronic respiratory symptoms may be caused by airway inflammation.

## 2022-03-24 NOTE — ADDENDUM
[FreeTextEntry1] : Documented by Luis E Loyd acting as a scribe for Dr. Braulio Orourke on 03/24/2022 \par \par All medical record entries made by the Scribe were at my, Dr. Braulio Orourke's, direction and personally dictated by me on 03/24/2022 . I have reviewed the chart and agree that the record accurately reflects my personal performance of the history, physical exam, assessment and plan. I have also personally directed, reviewed, and agree with the discharge instructions

## 2022-04-21 ENCOUNTER — TRANSCRIPTION ENCOUNTER (OUTPATIENT)
Age: 62
End: 2022-04-21

## 2022-04-21 ENCOUNTER — NON-APPOINTMENT (OUTPATIENT)
Age: 62
End: 2022-04-21

## 2022-09-14 ENCOUNTER — RX RENEWAL (OUTPATIENT)
Age: 62
End: 2022-09-14

## 2022-09-22 ENCOUNTER — APPOINTMENT (OUTPATIENT)
Dept: PULMONOLOGY | Facility: CLINIC | Age: 62
End: 2022-09-22

## 2022-09-22 ENCOUNTER — NON-APPOINTMENT (OUTPATIENT)
Age: 62
End: 2022-09-22

## 2022-09-22 VITALS
HEART RATE: 86 BPM | RESPIRATION RATE: 16 BRPM | OXYGEN SATURATION: 97 % | TEMPERATURE: 97.4 F | DIASTOLIC BLOOD PRESSURE: 70 MMHG | SYSTOLIC BLOOD PRESSURE: 122 MMHG | WEIGHT: 151 LBS | BODY MASS INDEX: 27.09 KG/M2 | HEIGHT: 62.5 IN

## 2022-09-22 PROCEDURE — 94010 BREATHING CAPACITY TEST: CPT

## 2022-09-22 PROCEDURE — 99214 OFFICE O/P EST MOD 30 MIN: CPT | Mod: 25

## 2022-09-22 PROCEDURE — 95012 NITRIC OXIDE EXP GAS DETER: CPT

## 2022-09-22 NOTE — HISTORY OF PRESENT ILLNESS
[FreeTextEntry1] : Ms. Altamirano is a 62 year old female presenting to the office for a follow up visit for allergic rhinitis, abnormal chest CT, asthma, and low vitamin D. Her chief complaint is\par -she notes that her energy levels are great when she is not working\par -she notes that her sleep has been worse on weeks when she is working\par -she denies SOB \par -she notes needing to see an opthamologist \par -she notes she got food poisoning last night\par -s/p covid 19 updated COVID vaccine\par -s/p first shingles vaccne\par \par \par -patient denies any headaches, nausea, vomiting, fever, chills, sweats, chest pain, chest pressure, palpitations, coughing, wheezing, fatigue, diarrhea, constipation, dysphagia, myalgias, dizziness, leg swelling, leg pain, itchy eyes, itchy ears, heartburn, reflux or sour taste in the mouth

## 2022-09-22 NOTE — ASSESSMENT
[FreeTextEntry1] : Ms. Altamirano is 62 y.o F who is stable from a pulmonary perspective with a history of mild intermittent asthma, allergies, and abnormal chest CT - quiet,s/p COVID 19 vaccine x 4 - stable\par \par problem 1: abnormal chest CT  (found s/p uterine surgery) (improved)\par -f/u chest CT due (last 4/2022) - next 10/2023\par CAT scans are the only radiological modality to identify abnormalities w/in the lungs with regards to nodules/masses/lymph nodes. Risks, benefits were reviewed in detail. The guidelines for abnormalities include follow up CT scans at various intervals which could range from 6 weeks to 1 year intervals. If there is a change for the worse then consideration for a biopsy will be considered if you are a candidate. Second opinion evaluation with a thoracic surgeon or an interventional radiologist could be offered. \par \par problem 2: allergic rhinitis\par -continue to use Clarinex 5 mg QAM\par -continue to use Xyzal 5 mg QHS\par -recommended Xlear \par -continue Zaditor eye drops prn \par -Environmental measures for allergies were encouraged including mattress and pillow cover, air purifier, and environmental controls.\par \par problem 3: mild intermittent asthma- quiet \par -quiet, no therapy at this point in time\par -Asthma is  believed to be caused by inherited (genetic) and environmental factor, but its exact cause is unknown. Asthma may be triggered by allergens, lung infections, or irritants in the air. Asthma triggers are different for each person\par \par problem 4: low vitamin D\par -continue to use supplemental vitamin D \par -Has been associated with asthma exacerbations and increased allergic symptoms. The goal based on recent information is maintaining levels between 50-70 and low normal is 30. Recommended 50,000 units every two weeks to once a month depending on the level.\par \par problem 5: GI complaints (quiet)\par -recommended IB Guard\par \par problem 6: health maintenance \par -Covid 19 preclusions, vaccine and booster discussed at length and recommended \par -s/p COVID 19 vaccine Pfizer x 4 (9/2022)\par -Recommended to take Liva Guard to lower cholesterol\par -recommended CoQ 10 at 200 mg QD\par -recommended increased aerobic exercise \par -s/p flu shot - 2021\par -recommended strep pneumonia vaccines: Prevnar-13 vaccine, followed by Pneumo vaccine 23 one year following\par -recommended early intervention for URIs\par -recommended regular osteoporosis evaluations\par -recommended early dermatological evaluations\par -recommended after the age of 50 to the age of 70, colonoscopy every 5 years \par \par F/U in 6 months \par She is encouraged to call with any changes, concerns, or questions

## 2022-09-22 NOTE — ADDENDUM
[FreeTextEntry1] : Documented by Miles Dias acting as a scribe for Dr. Braulio Orourke on 09/22/2022.\par \par All medical record entries made by the Scribe were at my, Dr. Braulio Orourke's, direction and personally dictated by me on 09/22/2022. I have reviewed the chart and agree that the record accurately reflects my personal performance of the history, physical exam, assessment and plan. I have also personally directed, reviewed, and agree with the discharge instructions.

## 2022-09-22 NOTE — PROCEDURE
[FreeTextEntry1] : CT chest (4/19/22) revealed stable nodules.\par \par PFT revealed normal flows, with a FEV1 of 2.20L, which is 91% of predicted, with a normal flow volume loop\par \par Feno was 15; a normal value being less than 25. Fractional exhaled nitric oxide (FENO) is regarded as a simple, noninvasive method for assessing eosinophilic airway inflammation. Produced by a variety of cells within the lung, nitric oxide (NO) concentrations are generally low in healthy individuals. However, high concentrations of NO appear to be involved in nonspecific host defense mechanisms and chronic inflammatory  diseases such as asthma. The American Thoracic Society (ATS) therefore recommended using FENO to aid in the diagnosis and monitoring of eosinophilic airway inflammation and asthma, and for identifying steroid responsive individuals whose chronic respiratory symptoms may be caused by airway inflammation

## 2023-03-22 ENCOUNTER — APPOINTMENT (OUTPATIENT)
Dept: OBGYN | Facility: CLINIC | Age: 63
End: 2023-03-22
Payer: COMMERCIAL

## 2023-03-22 VITALS — WEIGHT: 151 LBS | DIASTOLIC BLOOD PRESSURE: 80 MMHG | SYSTOLIC BLOOD PRESSURE: 130 MMHG | BODY MASS INDEX: 27.18 KG/M2

## 2023-03-22 DIAGNOSIS — Z01.419 ENCOUNTER FOR GYNECOLOGICAL EXAMINATION (GENERAL) (ROUTINE) W/OUT ABNORMAL FINDINGS: ICD-10-CM

## 2023-03-22 DIAGNOSIS — Z11.51 ENCOUNTER FOR SCREENING FOR HUMAN PAPILLOMAVIRUS (HPV): ICD-10-CM

## 2023-03-22 DIAGNOSIS — Z12.39 ENCOUNTER FOR OTHER SCREENING FOR MALIGNANT NEOPLASM OF BREAST: ICD-10-CM

## 2023-03-22 DIAGNOSIS — R92.2 INCONCLUSIVE MAMMOGRAM: ICD-10-CM

## 2023-03-22 PROCEDURE — 99396 PREV VISIT EST AGE 40-64: CPT

## 2023-03-23 ENCOUNTER — APPOINTMENT (OUTPATIENT)
Dept: PULMONOLOGY | Facility: CLINIC | Age: 63
End: 2023-03-23
Payer: COMMERCIAL

## 2023-03-23 ENCOUNTER — NON-APPOINTMENT (OUTPATIENT)
Age: 63
End: 2023-03-23

## 2023-03-23 VITALS
BODY MASS INDEX: 27.09 KG/M2 | HEART RATE: 86 BPM | RESPIRATION RATE: 16 BRPM | HEIGHT: 62.5 IN | WEIGHT: 151 LBS | TEMPERATURE: 97.3 F | SYSTOLIC BLOOD PRESSURE: 116 MMHG | DIASTOLIC BLOOD PRESSURE: 80 MMHG | OXYGEN SATURATION: 97 %

## 2023-03-23 PROCEDURE — 95012 NITRIC OXIDE EXP GAS DETER: CPT

## 2023-03-23 PROCEDURE — 94010 BREATHING CAPACITY TEST: CPT

## 2023-03-23 PROCEDURE — 99214 OFFICE O/P EST MOD 30 MIN: CPT | Mod: 25

## 2023-03-23 NOTE — PROCEDURE
[FreeTextEntry1] : PFT revealed mild restrictive dysfunction, with a FEV1 of 2.05L, which is 83% of predicted, with a normal flow volume loop \par \par Feno was 11; a normal value being less than 25. Fractional exhaled nitric oxide (FENO) is regarded as a simple, noninvasive method for assessing eosinophilic airway inflammation. Produced by a variety of cells within the lung, nitric oxide (NO) concentrations are generally low in healthy individuals. However, high concentrations of NO appear to be involved in nonspecific host defense mechanisms and chronic inflammatory  diseases such as asthma. The American Thoracic Society (ATS) therefore recommended using FENO to aid in the diagnosis and monitoring of eosinophilic airway inflammation and asthma, and for identifying steroid responsive individuals whose chronic respiratory symptoms may be caused by airway inflammation

## 2023-03-23 NOTE — HISTORY OF PRESENT ILLNESS
[FreeTextEntry1] : Ms. Altamirano is a 62 year old female presenting to the office for a follow up visit for allergic rhinitis, abnormal chest CT, asthma, and low vitamin D. Her chief complaint is\par \par -she notes she is stressed from her job\par -she noes she is on a 3-week vacation currently\par -she notes diarrhea and constipation occasionally\par -she notes that she has gained weight \par -she notes she needs to lose weight\par -she notes her energy levels are worse due to work issues\par \par \par \par -patient denies any headaches, nausea, vomiting, fever, chills, sweats, chest pain, chest pressure, palpitations, coughing, wheezing, fatigue, dysphagia, myalgias, dizziness, leg swelling, leg pain, itchy eyes, itchy ears

## 2023-03-23 NOTE — ASSESSMENT
[FreeTextEntry1] : Ms. Altamirano is 62 y.o F who is stable from a pulmonary perspective with a history of mild intermittent asthma, allergies, and abnormal chest CT - quiet, COVID 19 vaccine x 4 - 4/2022 - over worked\par \par problem 1: abnormal chest CT  (found s/p uterine surgery) (improved)\par -f/u chest CT due (last 4/2022) - next 10/2023\par CAT scans are the only radiological modality to identify abnormalities w/in the lungs with regards to nodules/masses/lymph nodes. Risks, benefits were reviewed in detail. The guidelines for abnormalities include follow up CT scans at various intervals which could range from 6 weeks to 1 year intervals. If there is a change for the worse then consideration for a biopsy will be considered if you are a candidate. Second opinion evaluation with a thoracic surgeon or an interventional radiologist could be offered. \par \par problem 2: allergic rhinitis - quiet\par -continue to use Clarinex 5 mg QAM\par -continue to use Xyzal 5 mg QHS\par -recommended Xlear \par -continue Zaditor eye drops prn \par -Environmental measures for allergies were encouraged including mattress and pillow cover, air purifier, and environmental controls.\par \par problem 3: mild intermittent asthma- quiet \par -quiet, no therapy at this point in time\par -Asthma is  believed to be caused by inherited (genetic) and environmental factor, but its exact cause is unknown. Asthma may be triggered by allergens, lung infections, or irritants in the air. Asthma triggers are different for each person\par \par problem 4: low vitamin D\par -continue to use supplemental vitamin D \par -Has been associated with asthma exacerbations and increased allergic symptoms. The goal based on recent information is maintaining levels between 50-70 and low normal is 30. Recommended 50,000 units every two weeks to once a month depending on the level.\par \par problem 5: GI complaints (quiet)\par -recommended IB Guard\par \par problem 6: health maintenance \par -Covid 19 preclusions, vaccine and booster discussed at length and recommended \par -s/p COVID 19 vaccine Pfizer x 4 (9/2022)\par -Recommended to take Liva Guard to lower cholesterol\par -recommended CoQ 10 at 200 mg QD\par -recommended increased aerobic exercise \par -s/p flu shot - 2022\par -recommended strep pneumonia vaccines: Prevnar-13 vaccine, followed by Pneumo vaccine 23 one year following\par -recommended early intervention for URIs\par -recommended regular osteoporosis evaluations\par -recommended early dermatological evaluations\par -recommended after the age of 50 to the age of 70, colonoscopy every 5 years \par \par F/U in 6 months \par She is encouraged to call with any changes, concerns, or questions

## 2023-03-23 NOTE — ADDENDUM
[FreeTextEntry1] : Documented by Miles Dias acting as a scribe for Dr. Braulio Orourke on 03/23/2023.\par \par All medical record entries made by the Scribe were at my, Dr. Braulio Orourke's, direction and personally dictated by me on 03/23/2023. I have reviewed the chart and agree that the record accurately reflects my personal performance of the history, physical exam, assessment and plan. I have also personally directed, reviewed, and agree with the discharge instructions.

## 2023-03-24 NOTE — PLAN
[FreeTextEntry1] : 61 y/o for annual exam.\par \par - pap done today\par - rx for mammo and breast sono given \par - rx for dexa given\par - referral for internists given

## 2023-03-24 NOTE — END OF VISIT
[FreeTextEntry3] : I, Khoa Falcon, acted as a scribe on behalf of Dr. Juani Dey on 03/22/2023 .\par \par All medical entries made by the scribe were at my, Dr. Juani Dey, direction and personally dictated by me on 03/22/2023. I have reviewed the chart and agree that the record accurately reflects my personal performance of the history, physical exam, assessment and plan. I have also personally directed, reviewed, and agreed with the chart.

## 2023-03-24 NOTE — HISTORY OF PRESENT ILLNESS
[FreeTextEntry1] : 63 y/o for routine annual GYN exam. Pt is doing well and had no complaints. \par \par  [PapSmeardate] : 9/20 [ColonoscopyDate] : 2021

## 2023-05-04 LAB
CYTOLOGY CVX/VAG DOC THIN PREP: ABNORMAL
HPV HIGH+LOW RISK DNA PNL CVX: NOT DETECTED

## 2023-09-21 ENCOUNTER — APPOINTMENT (OUTPATIENT)
Dept: PULMONOLOGY | Facility: CLINIC | Age: 63
End: 2023-09-21
Payer: COMMERCIAL

## 2023-09-21 VITALS
BODY MASS INDEX: 27.79 KG/M2 | WEIGHT: 151 LBS | HEART RATE: 91 BPM | RESPIRATION RATE: 16 BRPM | HEIGHT: 62 IN | DIASTOLIC BLOOD PRESSURE: 70 MMHG | TEMPERATURE: 97.3 F | OXYGEN SATURATION: 96 % | SYSTOLIC BLOOD PRESSURE: 110 MMHG

## 2023-09-21 DIAGNOSIS — R93.89 ABNORMAL FINDINGS ON DIAGNOSTIC IMAGING OF OTHER SPECIFIED BODY STRUCTURES: ICD-10-CM

## 2023-09-21 DIAGNOSIS — E66.3 OVERWEIGHT: ICD-10-CM

## 2023-09-21 DIAGNOSIS — J45.909 UNSPECIFIED ASTHMA, UNCOMPLICATED: ICD-10-CM

## 2023-09-21 DIAGNOSIS — J30.9 ALLERGIC RHINITIS, UNSPECIFIED: ICD-10-CM

## 2023-09-21 DIAGNOSIS — R79.89 OTHER SPECIFIED ABNORMAL FINDINGS OF BLOOD CHEMISTRY: ICD-10-CM

## 2023-09-21 PROCEDURE — 94010 BREATHING CAPACITY TEST: CPT

## 2023-09-21 PROCEDURE — 95012 NITRIC OXIDE EXP GAS DETER: CPT

## 2023-09-21 PROCEDURE — 99214 OFFICE O/P EST MOD 30 MIN: CPT | Mod: 25

## 2023-09-21 RX ORDER — DESLORATADINE 5 MG/1
5 TABLET ORAL
Qty: 90 | Refills: 1 | Status: ACTIVE | COMMUNITY
Start: 2022-03-24 | End: 1900-01-01

## 2024-07-29 ENCOUNTER — APPOINTMENT (OUTPATIENT)
Dept: GASTROENTEROLOGY | Facility: CLINIC | Age: 64
End: 2024-07-29

## 2024-08-07 ENCOUNTER — APPOINTMENT (OUTPATIENT)
Dept: PULMONOLOGY | Facility: CLINIC | Age: 64
End: 2024-08-07

## 2024-08-07 PROBLEM — R06.02 SOB (SHORTNESS OF BREATH): Status: ACTIVE | Noted: 2024-08-07

## 2024-08-07 PROCEDURE — 94010 BREATHING CAPACITY TEST: CPT

## 2024-08-07 PROCEDURE — 94729 DIFFUSING CAPACITY: CPT

## 2024-08-07 PROCEDURE — ZZZZZ: CPT

## 2024-08-07 PROCEDURE — 99214 OFFICE O/P EST MOD 30 MIN: CPT | Mod: 25

## 2024-08-07 PROCEDURE — 94727 GAS DIL/WSHOT DETER LNG VOL: CPT

## 2024-08-07 PROCEDURE — 95012 NITRIC OXIDE EXP GAS DETER: CPT

## 2024-08-07 NOTE — HISTORY OF PRESENT ILLNESS
[FreeTextEntry1] :  Evelia is a 63 year old female presenting to the office for a follow-up pulmonary evaluation for allergic rhinitis, abnormal chest CT, asthma, and low vitamin D. Her chief complaint is  -she notes feeling exhausted from work (late nights) -she notes her diet could be improved -she denies exercising beyond walking the dog -she denies coughing, wheezing, or SOB -she notes GI Sx of heartburn and reflux, which are improving now (Kristin Greenwood) -she notes taking Allegra daily. She's not on any other medications at this time  -she denies any headaches, nausea, emesis, fever, chills, sweats, chest pain, chest pressure, coughing, wheezing, palpitations, diarrhea, constipation, dysphagia, vertigo, arthralgias, myalgias, leg swelling, itchy eyes, itchy ears, or sour taste in the mouth.

## 2024-08-07 NOTE — ASSESSMENT
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 Risks, benefits were reviewed in detail. The guidelines for abnormalities include follow up CT scans at various intervals which could range from 6 weeks to 1 year intervals. If there is a change for the worse then consideration for a biopsy will be considered if you are a candidate. Second opinion evaluation with a thoracic surgeon or an interventional radiologist could be offered.   Problem 1A: Cardiac Disease -follow up with Dr. Solis Hudson  problem 2: allergic rhinitis - quiet -continue Allegra 180 mg QAM  -recommended Xlear  -continue Zaditor eye drops prn  -Environmental measures for allergies were encouraged including mattress and pillow cover, air purifier, and environmental controls.  problem 3: mild intermittent asthma- quiet  -quiet, no therapy at this point in time -Asthma is  believed to be caused by inherited (genetic) and environmental factor, but its exact cause is unknown. Asthma may be triggered by allergens, lung infections, or irritants in the air. Asthma triggers are different for each person  problem 4: low vitamin D -continue to use supplemental vitamin D  -Has been associated with asthma exacerbations and increased allergic symptoms. The goal based on recent information is maintaining levels between 50-70 and low normal is 30. Recommended 50,000 units every two weeks to once a month depending on the level.  problem 5: GI complaints (quiet) -recommended IB Guard  problem 6: health maintenance  -TDaP vaccine discussed with patient 08/07/2024  -recommended RSV vaccine in the Fall for anyone over the age of 60 -recommended Berberine for visceral fat -Covid 19 preclusions, vaccine and booster discussed at length and recommended  -s/p COVID 19 vaccine Pfizer x 4 (9/2022) -Recommended to take Liva Guard to lower cholesterol -recommended CoQ 10 at 200 mg QD -recommended increased aerobic exercise  -s/p flu shot - 2023 -recommended strep pneumonia vaccines: Prevnar-13 vaccine, followed by Pneumo vaccine 23 one year following -recommended early intervention for URIs -recommended regular osteoporosis evaluations -recommended early dermatological evaluations -recommended after the age of 50 to the age of 70, colonoscopy every 5 years   F/P in 12 months. She is encouraged to call with any changes, concerns, or questions

## 2024-08-07 NOTE — PROCEDURE
[FreeTextEntry1] : Full PFT reveals mild obstructive dysfunction at mid-low lung volumes; FEV1 was 2.03L which is 91% of predicted; normal lung volumes; normal diffusion at 17.6, which is 90% of predicted; normal flow volume loop. PFTs were performed to evaluate for SOB, asthma  Chest CT (5/17/2024) revealed no active pulmonary disease. Atherosclerotic calcifications noted in the thoracic aorta. Stable, less than 6 mm, noncalcified nodules noted bilaterally, unchanged in size, number, and appearance compared with prior CT scans dating to 1/13/12 and consistent with granulomas.  FENO was 13; a normal value being less than 25 Fractional exhaled nitric oxide (FENO) is regarded as a simple, noninvasive method for assessing eosinophilic airway inflammation. Produced by a variety of cells within the lung, nitric oxide (NO) concentrations are generally low in healthy individuals. However, high concentrations of NO appear to be involved in nonspecific host defense mechanisms and chronic inflammatory diseases such as asthma. The American Thoracic Society (ATS) therefore has recommended using FENO to aid in the diagnosis and monitoring of eosinophilic airway inflammation and asthma, and for identifying steroid responsive individuals whose chronic respiratory symptoms may be caused by airway inflammation.

## 2024-08-07 NOTE — ADDENDUM
[FreeTextEntry1] : Documented by Doreen Pineda acting as a scribe for Dr. Braulio Orourke on 08/07/2024. All medical record entries made by the Scribe were at my, Dr. Braulio Orourke's, direction and personally dictated by me on 08/07/2024. I have reviewed the chart and agree that the record accurately reflects my personal performance of the history, physical exam, assessment and plan. I have also personally directed, reviewed, and agree with the discharge instructions.

## 2024-08-08 ENCOUNTER — APPOINTMENT (OUTPATIENT)
Dept: OBGYN | Facility: CLINIC | Age: 64
End: 2024-08-08

## 2024-08-08 PROBLEM — Z13.820 OSTEOPOROSIS SCREENING: Status: ACTIVE | Noted: 2024-08-08

## 2024-08-08 PROCEDURE — 99396 PREV VISIT EST AGE 40-64: CPT

## 2024-08-08 NOTE — PHYSICAL EXAM

## 2024-08-09 NOTE — HISTORY OF PRESENT ILLNESS
[Patient reported PAP Smear was abnormal] : Patient reported PAP Smear was abnormal [FreeTextEntry1] : 62 yo presents for an annual. She is doing well and has no complaints.  GYNHx: Abnl pap PMH: Hemorrhagic cystitis, early-stage uterine ca PSH: Cystoscopy, hysterectomy, lumpectomy (2016) [Mammogramdate] : 08/23 [BreastSonogramDate] : 08/23 [PapSmeardate] : 03/23

## 2024-08-09 NOTE — END OF VISIT
[FreeTextEntry3] : I, Nathalie Hudson, acted as a scribe on behalf of Dr. Juani Dey M.D. on 08/08/2024.   All medical entries made by the scribe were at my, Dr. Juani Dey M.D., direction and personally dictated by me on 08/08/2024. I have reviewed the chart and agree that the record accurately reflects my personal performance of the history, physical exam, assessment and plan. I have also personally directed, reviewed, and agreed with the chart.

## 2024-08-09 NOTE — HISTORY OF PRESENT ILLNESS
[Patient reported PAP Smear was abnormal] : Patient reported PAP Smear was abnormal [FreeTextEntry1] : 64 yo presents for an annual. She is doing well and has no complaints.  GYNHx: Abnl pap PMH: Hemorrhagic cystitis, early-stage uterine ca PSH: Cystoscopy, hysterectomy, lumpectomy (2016) [Mammogramdate] : 08/23 [BreastSonogramDate] : 08/23 [PapSmeardate] : 03/23

## 2024-08-09 NOTE — PLAN
Opt out [FreeTextEntry1] : 64 yo for an annual.  Hemorrhagic cystitis -f/u urologist prn  Health care maintenance -vaginal pap (h/o uterine cancer) -vit D/exercise -rx breast mammo/sono -rx dexa -colon screening reviewed, plans to resched -f/u PCP for annual and appropriate immunizations -rto 1 year

## 2024-08-09 NOTE — PLAN
[FreeTextEntry1] : 64 yo for an annual.  Hemorrhagic cystitis -f/u urologist prn  Health care maintenance -vaginal pap (h/o uterine cancer) -vit D/exercise -rx breast mammo/sono -rx dexa -colon screening reviewed, plans to resched -f/u PCP for annual and appropriate immunizations -rto 1 year

## 2025-04-15 ENCOUNTER — TRANSCRIPTION ENCOUNTER (OUTPATIENT)
Age: 65
End: 2025-04-15

## 2025-08-07 ENCOUNTER — APPOINTMENT (OUTPATIENT)
Dept: PULMONOLOGY | Facility: CLINIC | Age: 65
End: 2025-08-07
Payer: COMMERCIAL

## 2025-08-07 VITALS
WEIGHT: 155 LBS | SYSTOLIC BLOOD PRESSURE: 118 MMHG | HEIGHT: 62 IN | RESPIRATION RATE: 16 BRPM | TEMPERATURE: 97.7 F | OXYGEN SATURATION: 96 % | HEART RATE: 86 BPM | DIASTOLIC BLOOD PRESSURE: 64 MMHG | BODY MASS INDEX: 28.52 KG/M2

## 2025-08-07 DIAGNOSIS — J45.909 UNSPECIFIED ASTHMA, UNCOMPLICATED: ICD-10-CM

## 2025-08-07 DIAGNOSIS — R93.89 ABNORMAL FINDINGS ON DIAGNOSTIC IMAGING OF OTHER SPECIFIED BODY STRUCTURES: ICD-10-CM

## 2025-08-07 DIAGNOSIS — R79.89 OTHER SPECIFIED ABNORMAL FINDINGS OF BLOOD CHEMISTRY: ICD-10-CM

## 2025-08-07 DIAGNOSIS — R06.02 SHORTNESS OF BREATH: ICD-10-CM

## 2025-08-07 DIAGNOSIS — J30.9 ALLERGIC RHINITIS, UNSPECIFIED: ICD-10-CM

## 2025-08-07 DIAGNOSIS — M85.80 OTHER SPECIFIED DISORDERS OF BONE DENSITY AND STRUCTURE, UNSPECIFIED SITE: ICD-10-CM

## 2025-08-07 DIAGNOSIS — Z87.39 PERSONAL HISTORY OF OTHER DISEASES OF THE MUSCULOSKELETAL SYSTEM AND CONNECTIVE TISSUE: ICD-10-CM

## 2025-08-07 PROCEDURE — 94010 BREATHING CAPACITY TEST: CPT

## 2025-08-07 PROCEDURE — ZZZZZ: CPT

## 2025-08-07 PROCEDURE — 95012 NITRIC OXIDE EXP GAS DETER: CPT

## 2025-08-07 PROCEDURE — 99214 OFFICE O/P EST MOD 30 MIN: CPT | Mod: 25

## 2025-08-19 DIAGNOSIS — Z12.31 ENCOUNTER FOR SCREENING MAMMOGRAM FOR MALIGNANT NEOPLASM OF BREAST: ICD-10-CM
